# Patient Record
Sex: MALE | Race: OTHER
[De-identification: names, ages, dates, MRNs, and addresses within clinical notes are randomized per-mention and may not be internally consistent; named-entity substitution may affect disease eponyms.]

---

## 2020-07-08 ENCOUNTER — HOSPITAL ENCOUNTER (OUTPATIENT)
Dept: HOSPITAL 8 - RAD | Age: 35
Discharge: HOME | End: 2020-07-08
Attending: NURSE PRACTITIONER
Payer: COMMERCIAL

## 2020-07-08 DIAGNOSIS — I10: Primary | ICD-10-CM

## 2020-07-08 PROCEDURE — 93975 VASCULAR STUDY: CPT

## 2020-12-07 ENCOUNTER — HOSPITAL ENCOUNTER (INPATIENT)
Dept: HOSPITAL 8 - ED | Age: 35
LOS: 3 days | Discharge: HOME | DRG: 871 | End: 2020-12-10
Attending: STUDENT IN AN ORGANIZED HEALTH CARE EDUCATION/TRAINING PROGRAM | Admitting: STUDENT IN AN ORGANIZED HEALTH CARE EDUCATION/TRAINING PROGRAM
Payer: COMMERCIAL

## 2020-12-07 VITALS — WEIGHT: 267.42 LBS | HEIGHT: 72 IN | BODY MASS INDEX: 36.22 KG/M2

## 2020-12-07 DIAGNOSIS — Z88.8: ICD-10-CM

## 2020-12-07 DIAGNOSIS — I13.0: ICD-10-CM

## 2020-12-07 DIAGNOSIS — Z79.899: ICD-10-CM

## 2020-12-07 DIAGNOSIS — Z20.828: ICD-10-CM

## 2020-12-07 DIAGNOSIS — Z88.0: ICD-10-CM

## 2020-12-07 DIAGNOSIS — I50.21: ICD-10-CM

## 2020-12-07 DIAGNOSIS — Z87.891: ICD-10-CM

## 2020-12-07 DIAGNOSIS — I42.0: ICD-10-CM

## 2020-12-07 DIAGNOSIS — A41.89: Primary | ICD-10-CM

## 2020-12-07 DIAGNOSIS — I34.0: ICD-10-CM

## 2020-12-07 DIAGNOSIS — J15.9: ICD-10-CM

## 2020-12-07 DIAGNOSIS — N17.0: ICD-10-CM

## 2020-12-07 DIAGNOSIS — N18.9: ICD-10-CM

## 2020-12-07 DIAGNOSIS — I16.1: ICD-10-CM

## 2020-12-07 LAB
ALBUMIN SERPL-MCNC: 3.8 G/DL (ref 3.4–5)
ANION GAP SERPL CALC-SCNC: 9 MMOL/L (ref 5–15)
BASOPHILS # BLD AUTO: 0.1 X10^3/UL (ref 0–0.1)
BASOPHILS NFR BLD AUTO: 1 % (ref 0–1)
CALCIUM SERPL-MCNC: 8.5 MG/DL (ref 8.5–10.1)
CHLORIDE SERPL-SCNC: 110 MMOL/L (ref 98–107)
CREAT SERPL-MCNC: 1.61 MG/DL (ref 0.7–1.3)
CRP SERPL-MCNC: 0.67 MG/DL (ref 0.02–0.49)
EOSINOPHIL # BLD AUTO: 0.2 X10^3/UL (ref 0–0.4)
EOSINOPHIL NFR BLD AUTO: 1 % (ref 1–7)
ERYTHROCYTE [DISTWIDTH] IN BLOOD BY AUTOMATED COUNT: 13.4 % (ref 9.4–14.8)
LYMPHOCYTES # BLD AUTO: 2.3 X10^3/UL (ref 1–3.4)
LYMPHOCYTES NFR BLD AUTO: 18 % (ref 22–44)
MCH RBC QN AUTO: 33.9 PG (ref 27.5–34.5)
MCHC RBC AUTO-ENTMCNC: 35 G/DL (ref 33.2–36.2)
MD: (no result)
MONOCYTES # BLD AUTO: 0.7 X10^3/UL (ref 0.2–0.8)
MONOCYTES NFR BLD AUTO: 6 % (ref 2–9)
NEUTROPHILS # BLD AUTO: 9.3 X10^3/UL (ref 1.8–6.8)
NEUTROPHILS NFR BLD AUTO: 74 % (ref 42–75)
PLATELET # BLD AUTO: 613 X10^3/UL (ref 130–400)
PMV BLD AUTO: 8.2 FL (ref 7.4–10.4)
RBC # BLD AUTO: 4.95 X10^6/UL (ref 4.38–5.82)
TROPONIN I SERPL-MCNC: 0.07 NG/ML (ref 0–0.04)
TROPONIN I SERPL-MCNC: 0.08 NG/ML (ref 0–0.04)

## 2020-12-07 PROCEDURE — 84244 ASSAY OF RENIN: CPT

## 2020-12-07 PROCEDURE — 84155 ASSAY OF PROTEIN SERUM: CPT

## 2020-12-07 PROCEDURE — 74174 CTA ABD&PLVS W/CONTRAST: CPT

## 2020-12-07 PROCEDURE — 78452 HT MUSCLE IMAGE SPECT MULT: CPT

## 2020-12-07 PROCEDURE — 84484 ASSAY OF TROPONIN QUANT: CPT

## 2020-12-07 PROCEDURE — 83540 ASSAY OF IRON: CPT

## 2020-12-07 PROCEDURE — 85379 FIBRIN DEGRADATION QUANT: CPT

## 2020-12-07 PROCEDURE — 80307 DRUG TEST PRSMV CHEM ANLYZR: CPT

## 2020-12-07 PROCEDURE — C9898 INPNT STAY RADIOLABELED ITEM: HCPCS

## 2020-12-07 PROCEDURE — 93017 CV STRESS TEST TRACING ONLY: CPT

## 2020-12-07 PROCEDURE — 85025 COMPLETE CBC W/AUTO DIFF WBC: CPT

## 2020-12-07 PROCEDURE — 83615 LACTATE (LD) (LDH) ENZYME: CPT

## 2020-12-07 PROCEDURE — 83880 ASSAY OF NATRIURETIC PEPTIDE: CPT

## 2020-12-07 PROCEDURE — 84443 ASSAY THYROID STIM HORMONE: CPT

## 2020-12-07 PROCEDURE — 36415 COLL VENOUS BLD VENIPUNCTURE: CPT

## 2020-12-07 PROCEDURE — 84165 PROTEIN E-PHORESIS SERUM: CPT

## 2020-12-07 PROCEDURE — 80048 BASIC METABOLIC PNL TOTAL CA: CPT

## 2020-12-07 PROCEDURE — A9502 TC99M TETROFOSMIN: HCPCS

## 2020-12-07 PROCEDURE — 93306 TTE W/DOPPLER COMPLETE: CPT

## 2020-12-07 PROCEDURE — 82088 ASSAY OF ALDOSTERONE: CPT

## 2020-12-07 PROCEDURE — 87040 BLOOD CULTURE FOR BACTERIA: CPT

## 2020-12-07 PROCEDURE — 82040 ASSAY OF SERUM ALBUMIN: CPT

## 2020-12-07 PROCEDURE — 83605 ASSAY OF LACTIC ACID: CPT

## 2020-12-07 PROCEDURE — 82728 ASSAY OF FERRITIN: CPT

## 2020-12-07 PROCEDURE — 83735 ASSAY OF MAGNESIUM: CPT

## 2020-12-07 PROCEDURE — 80061 LIPID PANEL: CPT

## 2020-12-07 PROCEDURE — 86140 C-REACTIVE PROTEIN: CPT

## 2020-12-07 PROCEDURE — 84145 PROCALCITONIN (PCT): CPT

## 2020-12-07 PROCEDURE — 87400 INFLUENZA A/B EACH AG IA: CPT

## 2020-12-07 PROCEDURE — 83550 IRON BINDING TEST: CPT

## 2020-12-07 PROCEDURE — 93005 ELECTROCARDIOGRAM TRACING: CPT

## 2020-12-07 PROCEDURE — U0003 INFECTIOUS AGENT DETECTION BY NUCLEIC ACID (DNA OR RNA); SEVERE ACUTE RESPIRATORY SYNDROME CORONAVIRUS 2 (SARS-COV-2) (CORONAVIRUS DISEASE [COVID-19]), AMPLIFIED PROBE TECHNIQUE, MAKING USE OF HIGH THROUGHPUT TECHNOLOGIES AS DESCRIBED BY CMS-2020-01-R: HCPCS

## 2020-12-07 PROCEDURE — 82390 ASSAY OF CERULOPLASMIN: CPT

## 2020-12-07 PROCEDURE — 84100 ASSAY OF PHOSPHORUS: CPT

## 2020-12-07 RX ADMIN — LABETALOL HYDROCHLORIDE PRN MG: 5 INJECTION INTRAVENOUS at 18:20

## 2020-12-07 RX ADMIN — AMLODIPINE BESYLATE SCH MG: 5 TABLET ORAL at 20:00

## 2020-12-07 NOTE — NUR
Pt reticent about being admitted, explained to the patiet that going home AMA 
would not be safe. Prt states feeling less dysneic. VS updated.

## 2020-12-07 NOTE — NUR
Pt resting, remains hypertensive and tachycardic. Dyspnea still improved. 
Tranadate 100mg tab request sent to pharm. VS updated.

## 2020-12-07 NOTE — NUR
Pt c/o worsening dyspnea. Chest xray at  shows pleural effusions and 
pneumonia. Pt states SOB X 8 days with HTN. Tachycardic and diaphoretic on 
arrival.

## 2020-12-08 VITALS — DIASTOLIC BLOOD PRESSURE: 102 MMHG | SYSTOLIC BLOOD PRESSURE: 150 MMHG

## 2020-12-08 VITALS — SYSTOLIC BLOOD PRESSURE: 163 MMHG | DIASTOLIC BLOOD PRESSURE: 113 MMHG

## 2020-12-08 VITALS — SYSTOLIC BLOOD PRESSURE: 159 MMHG | DIASTOLIC BLOOD PRESSURE: 110 MMHG

## 2020-12-08 VITALS — DIASTOLIC BLOOD PRESSURE: 146 MMHG | SYSTOLIC BLOOD PRESSURE: 191 MMHG

## 2020-12-08 VITALS — DIASTOLIC BLOOD PRESSURE: 86 MMHG | SYSTOLIC BLOOD PRESSURE: 135 MMHG

## 2020-12-08 LAB
ANION GAP SERPL CALC-SCNC: 8 MMOL/L (ref 5–15)
BASOPHILS # BLD AUTO: 0.1 X10^3/UL (ref 0–0.1)
BASOPHILS NFR BLD AUTO: 1 % (ref 0–1)
CALCIUM SERPL-MCNC: 7.9 MG/DL (ref 8.5–10.1)
CHLORIDE SERPL-SCNC: 106 MMOL/L (ref 98–107)
CHOL/HDL RATIO: 3.3
CREAT SERPL-MCNC: 1.44 MG/DL (ref 0.7–1.3)
EOSINOPHIL # BLD AUTO: 0.1 X10^3/UL (ref 0–0.4)
EOSINOPHIL NFR BLD AUTO: 2 % (ref 1–7)
ERYTHROCYTE [DISTWIDTH] IN BLOOD BY AUTOMATED COUNT: 13.7 % (ref 9.4–14.8)
HDL CHOL %: 30 % (ref 26–37)
HDL CHOLESTEROL (DIRECT): 42 MG/DL (ref 40–60)
LDL CHOLESTEROL,CALCULATED: 50 MG/DL (ref 54–169)
LDLC/HDLC SERPL: 1.2 {RATIO} (ref 0.5–3)
LYMPHOCYTES # BLD AUTO: 1.5 X10^3/UL (ref 1–3.4)
LYMPHOCYTES NFR BLD AUTO: 19 % (ref 22–44)
MCH RBC QN AUTO: 34 PG (ref 27.5–34.5)
MCHC RBC AUTO-ENTMCNC: 35.2 G/DL (ref 33.2–36.2)
MD: NO
MONOCYTES # BLD AUTO: 0.5 X10^3/UL (ref 0.2–0.8)
MONOCYTES NFR BLD AUTO: 7 % (ref 2–9)
NEUTROPHILS # BLD AUTO: 5.7 X10^3/UL (ref 1.8–6.8)
NEUTROPHILS NFR BLD AUTO: 71 % (ref 42–75)
PLATELET # BLD AUTO: 538 X10^3/UL (ref 130–400)
PMV BLD AUTO: 8 FL (ref 7.4–10.4)
RBC # BLD AUTO: 4.38 X10^6/UL (ref 4.38–5.82)
TRIGL SERPL-MCNC: 240 MG/DL (ref 50–200)
TROPONIN I SERPL-MCNC: 0.05 NG/ML (ref 0–0.04)
VLDLC SERPL CALC-MCNC: 48 MG/DL (ref 0–25)

## 2020-12-08 RX ADMIN — CEFTRIAXONE SCH MLS/HR: 1 INJECTION, SOLUTION INTRAVENOUS at 09:58

## 2020-12-08 RX ADMIN — FUROSEMIDE SCH MG: 10 INJECTION, SOLUTION INTRAMUSCULAR; INTRAVENOUS at 18:20

## 2020-12-08 RX ADMIN — FUROSEMIDE SCH MG: 10 INJECTION, SOLUTION INTRAMUSCULAR; INTRAVENOUS at 09:58

## 2020-12-08 RX ADMIN — LABETALOL HYDROCHLORIDE SCH MG: 200 TABLET, FILM COATED ORAL at 09:59

## 2020-12-08 RX ADMIN — ATORVASTATIN CALCIUM SCH MG: 40 TABLET, FILM COATED ORAL at 21:39

## 2020-12-08 RX ADMIN — LABETALOL HYDROCHLORIDE PRN MG: 5 INJECTION INTRAVENOUS at 03:22

## 2020-12-08 RX ADMIN — AZITHROMYCIN FOR INJECTION INJECTION, POWDER, LYOPHILIZED, FOR SOLUTION SCH MLS/HR: 500 INJECTION INTRAVENOUS at 11:09

## 2020-12-08 RX ADMIN — AMLODIPINE BESYLATE SCH MG: 5 TABLET ORAL at 09:59

## 2020-12-08 RX ADMIN — LABETALOL HYDROCHLORIDE SCH MG: 200 TABLET, FILM COATED ORAL at 18:00

## 2020-12-09 VITALS — DIASTOLIC BLOOD PRESSURE: 111 MMHG | SYSTOLIC BLOOD PRESSURE: 155 MMHG

## 2020-12-09 VITALS — SYSTOLIC BLOOD PRESSURE: 137 MMHG | DIASTOLIC BLOOD PRESSURE: 91 MMHG

## 2020-12-09 VITALS — SYSTOLIC BLOOD PRESSURE: 130 MMHG | DIASTOLIC BLOOD PRESSURE: 76 MMHG

## 2020-12-09 VITALS — SYSTOLIC BLOOD PRESSURE: 155 MMHG | DIASTOLIC BLOOD PRESSURE: 109 MMHG

## 2020-12-09 LAB
% IRON SATURATION: 20 % (ref 20–55)
ANION GAP SERPL CALC-SCNC: 8 MMOL/L (ref 5–15)
CALCIUM SERPL-MCNC: 8.1 MG/DL (ref 8.5–10.1)
CHLORIDE SERPL-SCNC: 105 MMOL/L (ref 98–107)
CHOL/HDL RATIO: 3.4
CREAT SERPL-MCNC: 1.44 MG/DL (ref 0.7–1.3)
HDL CHOL %: 29 % (ref 26–37)
HDL CHOLESTEROL (DIRECT): 40 MG/DL (ref 40–60)
IRON LEVEL: 69 MCG/DL (ref 65–175)
LDL CHOLESTEROL,CALCULATED: 51 MG/DL (ref 54–169)
LDLC/HDLC SERPL: 1.3 {RATIO} (ref 0.5–3)
TIBC SERPL-MCNC: 349 MCG/DL (ref 250–450)
TRIGL SERPL-MCNC: 228 MG/DL (ref 50–200)
VLDLC SERPL CALC-MCNC: 46 MG/DL (ref 0–25)

## 2020-12-09 RX ADMIN — CARVEDILOL SCH MG: 12.5 TABLET, FILM COATED ORAL at 16:50

## 2020-12-09 RX ADMIN — FUROSEMIDE SCH MG: 10 INJECTION, SOLUTION INTRAMUSCULAR; INTRAVENOUS at 16:50

## 2020-12-09 RX ADMIN — CEFTRIAXONE SCH MLS/HR: 1 INJECTION, SOLUTION INTRAVENOUS at 08:56

## 2020-12-09 RX ADMIN — AMLODIPINE BESYLATE SCH MG: 5 TABLET ORAL at 08:56

## 2020-12-09 RX ADMIN — SACUBITRIL AND VALSARTAN SCH TAB: 24; 26 TABLET, FILM COATED ORAL at 20:12

## 2020-12-09 RX ADMIN — ASPIRIN SCH MG: 81 TABLET, COATED ORAL at 06:42

## 2020-12-09 RX ADMIN — AZITHROMYCIN FOR INJECTION INJECTION, POWDER, LYOPHILIZED, FOR SOLUTION SCH MLS/HR: 500 INJECTION INTRAVENOUS at 10:27

## 2020-12-09 RX ADMIN — ATORVASTATIN CALCIUM SCH MG: 40 TABLET, FILM COATED ORAL at 20:13

## 2020-12-09 RX ADMIN — SPIRONOLACTONE SCH MG: 25 TABLET ORAL at 16:51

## 2020-12-09 RX ADMIN — FUROSEMIDE SCH MG: 10 INJECTION, SOLUTION INTRAMUSCULAR; INTRAVENOUS at 08:56

## 2020-12-09 RX ADMIN — LABETALOL HYDROCHLORIDE SCH MG: 200 TABLET, FILM COATED ORAL at 16:50

## 2020-12-09 RX ADMIN — LABETALOL HYDROCHLORIDE SCH MG: 200 TABLET, FILM COATED ORAL at 06:42

## 2020-12-10 VITALS — SYSTOLIC BLOOD PRESSURE: 142 MMHG | DIASTOLIC BLOOD PRESSURE: 97 MMHG

## 2020-12-10 VITALS — SYSTOLIC BLOOD PRESSURE: 151 MMHG | DIASTOLIC BLOOD PRESSURE: 95 MMHG

## 2020-12-10 VITALS — DIASTOLIC BLOOD PRESSURE: 110 MMHG | SYSTOLIC BLOOD PRESSURE: 154 MMHG

## 2020-12-10 LAB
ANION GAP SERPL CALC-SCNC: 8 MMOL/L (ref 5–15)
CALCIUM SERPL-MCNC: 8.5 MG/DL (ref 8.5–10.1)
CHLORIDE SERPL-SCNC: 106 MMOL/L (ref 98–107)
CREAT SERPL-MCNC: 1.48 MG/DL (ref 0.7–1.3)

## 2020-12-10 RX ADMIN — ASPIRIN SCH MG: 81 TABLET, COATED ORAL at 05:25

## 2020-12-10 RX ADMIN — LABETALOL HYDROCHLORIDE SCH MG: 200 TABLET, FILM COATED ORAL at 10:07

## 2020-12-10 RX ADMIN — CARVEDILOL SCH MG: 12.5 TABLET, FILM COATED ORAL at 05:25

## 2020-12-10 RX ADMIN — SACUBITRIL AND VALSARTAN SCH TAB: 24; 26 TABLET, FILM COATED ORAL at 10:07

## 2020-12-10 RX ADMIN — SPIRONOLACTONE SCH MG: 25 TABLET ORAL at 10:07

## 2020-12-10 RX ADMIN — AMLODIPINE BESYLATE SCH MG: 5 TABLET ORAL at 10:07

## 2020-12-10 RX ADMIN — AZITHROMYCIN FOR INJECTION INJECTION, POWDER, LYOPHILIZED, FOR SOLUTION SCH MLS/HR: 500 INJECTION INTRAVENOUS at 11:00

## 2020-12-10 RX ADMIN — CEFTRIAXONE SCH MLS/HR: 1 INJECTION, SOLUTION INTRAVENOUS at 10:07

## 2021-03-22 ENCOUNTER — HOSPITAL ENCOUNTER (OUTPATIENT)
Dept: HOSPITAL 8 - CFH | Age: 36
Discharge: HOME | End: 2021-03-22
Attending: PHYSICIAN ASSISTANT
Payer: COMMERCIAL

## 2021-03-22 DIAGNOSIS — J34.2: Primary | ICD-10-CM

## 2021-03-22 DIAGNOSIS — J34.89: ICD-10-CM

## 2021-03-22 DIAGNOSIS — R09.82: ICD-10-CM

## 2021-03-22 PROCEDURE — 70486 CT MAXILLOFACIAL W/O DYE: CPT
